# Patient Record
Sex: MALE | Race: BLACK OR AFRICAN AMERICAN | NOT HISPANIC OR LATINO | URBAN - METROPOLITAN AREA
[De-identification: names, ages, dates, MRNs, and addresses within clinical notes are randomized per-mention and may not be internally consistent; named-entity substitution may affect disease eponyms.]

---

## 2024-04-17 ENCOUNTER — PATIENT OUTREACH (OUTPATIENT)
Dept: OTOLARYNGOLOGY | Facility: CLINIC | Age: 39
End: 2024-04-17
Payer: COMMERCIAL

## 2024-04-17 ENCOUNTER — TELEPHONE (OUTPATIENT)
Dept: OTOLARYNGOLOGY | Facility: CLINIC | Age: 39
End: 2024-04-17
Payer: COMMERCIAL

## 2024-04-17 NOTE — PROGRESS NOTES
Called patient to let let him know since we have not seen him odalis gómez able to formally recommend voice rest. Writer encouraged patient to do what he thought would be best for his voice. Patient was agreeable and verbalized understanding of the situation. Alicja Hatch RN on 4/17/2024 at 2:37 PM

## 2024-04-17 NOTE — TELEPHONE ENCOUNTER
FUTURE VISIT INFORMATION      FUTURE VISIT INFORMATION:  Date: 4/18/24  Time: 1:00pm  [place] [facility] [place]ERRAL INFORMATION:  Reason for visit/diagnosis  newby- lost voice    RECORDS REQUESTED FROM:       No recs to collect

## 2024-04-17 NOTE — TELEPHONE ENCOUNTER
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 Pt lost voice on 4/10 - slowly came back with speaking 1st on 4/12 and was able to sing for performances on 4/14 and 4/16 - Pt is in [employer] with upcoming performances.  Please have [facility] SLP per protocols, call back pt to discuss.  [facility] [place]     Action Taken: Other: ENT    Travel Screening: Not Applicable

## 2024-04-18 ENCOUNTER — PRE VISIT (OUTPATIENT)
Dept: OTOLARYNGOLOGY | Facility: CLINIC | Age: 39
End: 2024-04-18

## 2024-04-18 ENCOUNTER — OFFICE VISIT (OUTPATIENT)
Dept: OTOLARYNGOLOGY | Facility: CLINIC | Age: 39
End: 2024-04-18
Payer: COMMERCIAL

## 2024-04-18 DIAGNOSIS — R49.0 DYSPHONIA: Primary | ICD-10-CM

## 2024-04-18 PROCEDURE — 31579 LARYNGOSCOPY TELESCOPIC: CPT | Performed by: SPEECH-LANGUAGE PATHOLOGIST

## 2024-04-18 PROCEDURE — 92507 TX SP LANG VOICE COMM INDIV: CPT | Mod: GN | Performed by: SPEECH-LANGUAGE PATHOLOGIST

## 2024-04-18 PROCEDURE — 92524 BEHAVRAL QUALIT ANALYS VOICE: CPT | Mod: GN | Performed by: SPEECH-LANGUAGE PATHOLOGIST

## 2024-04-18 NOTE — LETTER
4/18/2024       RE: Rafa Caldwell  [address] [place] [address]     Dear Colleague,    Thank you for referring your patient, Rafa Caldwell, to the [facility] [place] at [facility]. Please see a copy of my visit note below.          [facility]  Clinical Voice and Upper Airway Evaluation Report      Patient's Name: Rafa Caldwell  Date of Evaluation: 4/18/2024  Providing SLP: Senait Wagner, AMANDA< MA, CCC-SLP (was originally scheduled to see Johnna Magdaleno MS CCC-SLP, who had a medical emergency)  Referring Provider and Facility: self-referred  Insurance Coverage: [employer]  Chief Complaint: Dysphonia  Evaluation Location: [facility]    Patient History: Rafa is a 38-year-old male who presents today for evaluation of dysphonia. 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  He has worked with Dr. Henderson in [place], and Dimitry Giles, vocal  & [employer].     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  Management has been supportive.  Improves with: unknown at this time, but he has been resting his voice more  Worsens with: use  Previous voice therapy or other interventions: continued his regular warm up exercises.  No cool down exercise  Voice use considerations:   Currently performing on national [place] tour of [employer]  Will be in [place] until 4/28/24  The tour will then continue to [facility]    Dyspnea:   No issues    Dysphagia:   No issues    Cough / Throat Clearing:   No issues    Throat Pain:  No issues    Medical / Surgical History:   No remarkable history    Cognitive Status / Ability to Comprehend Directions:  "Excellent    Barriers to Progress: none    Daily Water Intake: good    Caffeine Intake: none    Alcohol Intake: none    Past / Current Tobacco Use / Exposure: none.  Stage fog only - minimal     Current Diet: very good. However, he does recall having a piece of pizza the day prior to the start of his symptoms.    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        Quality of Life Questionnaires:    PATIENT REPORTED MEASURES:       No data to display              Patient Supplied Answers To Last 2 VHI Questionnaires       No data to display                  Patient Supplied Answers To Last 2 CSI Questionnaires       No data to display                  Patient Supplied Answers To Last 2 EAT Questionnaires       No data to display                  Patient Supplied Answers to Dyspnea Index Questionnaire:       No data to display              Patient Supplied Answers To VHI Questionnaire      4/19/2024     1:37 PM   Voice Handicap Index (VHI-10)   My voice makes it difficult for people to hear me 0   People have difficulty understanding me in a noisy room 0   My voice difficulties restrict my personal and social life.  2   I feel left out of conversations because of my voice 2   My voice problem causes me to lose income 2   I feel as though I have to strain to produce voice 1   The clarity of my voice is unpredictable 2   My voice problem upsets me 1   My voice makes me feel handicapped 2   People ask, \"What's wrong with your voice?\" 1   VHI-10 13       Perceptual Analysis (58612): Evaluation of Voice / Speech / Non-Communicative Laryngeal Behaviors    Voice: mild to moderate roughness and breathiness. Intermittent subtle pitch instablility    Laryngeal palpation:   Thyrohyoid space: tender to touch  Suprahyoid region: WNL  Laryngeal lateralization: flexible    Additional observations:   Cough/ Throat Clear: not observed today    Breathing patterns: appropriate at rest   Overt tension: " minimal, but does endorse tension in the upper body  Habitual pitch: WNL compared to age- and gender-matched peers   Resonance: laryngeal  Loudness: appropriate mildly reduced    Acoustic and Aerodynamic Analysis (95310):  will be processed at a future appointment      Laryngoscopy with stroboscopy: Flexible endoscopy with chip-tip technology with stroboscopy, right nostril; topical anesthesia with 2% lidocaine and 0.025% oxymetazoline was sprayed into the nasal cavity and allowed 3 to 5 minutes for effect.    Provider performing exam: Senait Wagner MM. M.A. CCC-SLP    Informed consent: Informed verbal consent was obtained, which includes potential side-effects, risks, and benefits of the procedure.     Anesthetic: Topical anesthesia with 3% lidocaine and 0.25% phenylephrine was applied the nostrils bilaterally.     Scope type: A distal chip flexible laryngoscope was passed through the nare with xenon light source(s).    The laryngeal and pharyngeal structures were evaluated for gross appearance, mobility, function, and focal lesions / abnormalities of the associated mucosa.  *It should be noted that he has a significant gag reflex. Multiple trials were needed today to complete the exam with the necessary images.    Velar Function: complete closure without bubbling of secretions and no weakness observed   General Appearance: WNL   Secretions: WNL   Subglottis Appearance: visible portion is patent   R Arytenoid Abduction / Adduction: symmetrical and timely ab/adduction with appropriate range of motion  L Arytenoid Abduction / Adduction: symmetrical and timely ab/adduction with appropriate range of motion  Mediolateral Compression: moderately   Anteroposterior Compression: moderate  Significant pharyngeal squeeze was also noted.  Vocal Fold Elongation: appropriate   Left (L) Vocal Fold Edge and Mucosa: off white-pink and moderate edema comprising the entire length of the vocal fold. No visible signs of hemorrhage.  "  Right (R) Vocal Fold Edge and Mucosa: white with smooth and straight appearance   Narrow Band Imaging: demonstrates similar findings as halogen light. No remarkable thickening of the mucosa  Glottic Closure: complete   Phase Closure: predominantly open phase     The addition of stroboscopy allowed evaluation of the mucosal wave:  Amplitude: right: WNL; left: mildly increased.   Symmetry: intermittent symmetry and intermittent chasing asymmetry.  Closure pattern: complete.   Closure plane: at glottic level.   Phase distribution: normal.    Therapeutic Probes: flow phonation and resonant phonemes were helpful to reduce supraglottic tension    Therapeutic Techniques Attempted (73280 for Individual Speech Therapy):    Semi-Occluded Vocal Tract (SOVT) exercises instructed to reduce laryngeal tension, promote vocal fold pliability, and coordinate respiration and phonation  Straw phonation with water resistance was found to be most facilitating   Sustained phonation, and voice vs. voiceless productions used to promote easy voicing and raise awareness of laryngeal tension  Ascending and descending glides utilized to promote vocal fold pliability  \"Messa di voce\", gradual crescendo and decrescendo to vary medial compression was also utilized to promote vocal fold pliability.  Instructed on the benefits of using these exercises for improved coordination of breath flow with phonation and tissue mobilization.  Instructed on the importance of using these exercises as a warm-up / cool down,  and to re-calibrate the voice throughout the day.    Counseling was provided to support his voice symptoms:   - I also provided a letter for his management.      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 Perceptually, he demonstrated mild to moderate roughness and breathiness, and intermittent subtle pitch instability.  " Laryngoscopy today demonstrated off white-pink and moderate edema comprising the entire length of the vocal fold. No visible signs of hemorrhage. Therefore, continued care and therapy have been recommended.  He is in agreement with this plan of care.  Basic instructions were provided.       Dysphonia R49.0  Laryngeal Hyperfunction J38.7  Vocal Fold Edema      Goals:    Voice  Utilize abdominal breathing techniques in targeted activities (e.g. physical exercise, communication, high-level phonation/pitch range navigation exercises, etc.)  Demonstrate appropriate vocal hygiene including, but not limited to, adequate hydration and integrating behavioral and dietary changes for GERD into their daily life.?   Recoordinate respiratory and laryngeal musculature to resume activities of daily living.?   Coordinate phonatory and respiratory subsystems, demonstrating adequate independence for activities of daily communication   Decrease extrinsic laryngeal muscle activity during communication events     Laryngoscopy with stroboscopy 05782  Individual speech therapy session 69297  Perceptual voice assessment 27321  Assessment and treatment time: 75 minutes      Senait Wagner MM, MA CCC-SLP  Speech-Language Pathologist & Voice SLP Supervisor  [facility]  Department of Otolaryngology - Head and Neck Surgery  [facility]  [phone]    *This note may have been completed using [id number] dictation software, so errors may exist. Please contact me for clarification if needed*      Again, thank you for allowing me to participate in the care of your patient.      Sincerely,    Senait Wagner, SLP

## 2024-04-18 NOTE — PROGRESS NOTES
[facility]  Clinical Voice and Upper Airway Evaluation Report      Patient's Name: Rafa Caldwell  Date of Evaluation: 4/18/2024  Providing SLP: Senait Wagner, MM< MA, CCC-SLP (was originally scheduled to see Johnna Magdaleno MS CCC-SLP, who had a medical emergency)  Referring Provider and Facility: self-referred  Insurance Coverage: [employer]  Chief Complaint: Dysphonia  Evaluation Location: [facility]    Patient History: Rafa is a 38-year-old male who presents today for evaluation of dysphonia. 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  He has worked with Dr. Henderson in [place], and Dimitry Giles, vocal  & [employer].     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  Management has been supportive.  Improves with: unknown at this time, but he has been resting his voice more  Worsens with: use  Previous voice therapy or other interventions: continued his regular warm up exercises.  No cool down exercise  Voice use considerations:   Currently performing on national [place] tour of [employer]  Will be in [place] until 4/28/24  The tour will then continue to [facility]    Dyspnea:   No issues    Dysphagia:   No issues    Cough / Throat Clearing:   No issues    Throat Pain:  No issues    Medical / Surgical History:   No remarkable history    Cognitive Status / Ability to Comprehend Directions: Excellent    Barriers to Progress: none    Daily Water Intake: good    Caffeine Intake: none    Alcohol Intake: none    Past / Current Tobacco Use / Exposure: none.  Stage fog only - minimal     Current Diet: very good. However, he does recall having a piece of pizza the day prior to the start of his  "symptoms.    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        Quality of Life Questionnaires:    PATIENT REPORTED MEASURES:       No data to display              Patient Supplied Answers To Last 2 VHI Questionnaires       No data to display                  Patient Supplied Answers To Last 2 CSI Questionnaires       No data to display                  Patient Supplied Answers To Last 2 EAT Questionnaires       No data to display                  Patient Supplied Answers to Dyspnea Index Questionnaire:       No data to display              Patient Supplied Answers To VHI Questionnaire      4/19/2024     1:37 PM   Voice Handicap Index (VHI-10)   My voice makes it difficult for people to hear me 0   People have difficulty understanding me in a noisy room 0   My voice difficulties restrict my personal and social life.  2   I feel left out of conversations because of my voice 2   My voice problem causes me to lose income 2   I feel as though I have to strain to produce voice 1   The clarity of my voice is unpredictable 2   My voice problem upsets me 1   My voice makes me feel handicapped 2   People ask, \"What's wrong with your voice?\" 1   VHI-10 13       Perceptual Analysis (62507): Evaluation of Voice / Speech / Non-Communicative Laryngeal Behaviors    Voice: mild to moderate roughness and breathiness. Intermittent subtle pitch instablility    Laryngeal palpation:   Thyrohyoid space: tender to touch  Suprahyoid region: WNL  Laryngeal lateralization: flexible    Additional observations:   Cough/ Throat Clear: not observed today    Breathing patterns: appropriate at rest   Overt tension: minimal, but does endorse tension in the upper body  Habitual pitch: WNL compared to age- and gender-matched peers   Resonance: laryngeal  Loudness: appropriate mildly reduced    Acoustic and Aerodynamic Analysis (78726):  will be processed at a future appointment      Laryngoscopy with stroboscopy: " Flexible endoscopy with chip-tip technology with stroboscopy, right nostril; topical anesthesia with 2% lidocaine and 0.025% oxymetazoline was sprayed into the nasal cavity and allowed 3 to 5 minutes for effect.    Provider performing exam: Senait Wagner MM. M.A. CCC-SLP    Informed consent: Informed verbal consent was obtained, which includes potential side-effects, risks, and benefits of the procedure.     Anesthetic: Topical anesthesia with 3% lidocaine and 0.25% phenylephrine was applied the nostrils bilaterally.     Scope type: A distal chip flexible laryngoscope was passed through the nare with xenon light source(s).    The laryngeal and pharyngeal structures were evaluated for gross appearance, mobility, function, and focal lesions / abnormalities of the associated mucosa.  *It should be noted that he has a significant gag reflex. Multiple trials were needed today to complete the exam with the necessary images.    Velar Function: complete closure without bubbling of secretions and no weakness observed   General Appearance: WNL   Secretions: WNL   Subglottis Appearance: visible portion is patent   R Arytenoid Abduction / Adduction: symmetrical and timely ab/adduction with appropriate range of motion  L Arytenoid Abduction / Adduction: symmetrical and timely ab/adduction with appropriate range of motion  Mediolateral Compression: moderately   Anteroposterior Compression: moderate  Significant pharyngeal squeeze was also noted.  Vocal Fold Elongation: appropriate   Left (L) Vocal Fold Edge and Mucosa: off white-pink and moderate edema comprising the entire length of the vocal fold. No visible signs of hemorrhage.   Right (R) Vocal Fold Edge and Mucosa: white with smooth and straight appearance   Narrow Band Imaging: demonstrates similar findings as halogen light. No remarkable thickening of the mucosa  Glottic Closure: complete   Phase Closure: predominantly open phase     The addition of stroboscopy allowed  "evaluation of the mucosal wave:  Amplitude: right: WNL; left: mildly increased.   Symmetry: intermittent symmetry and intermittent chasing asymmetry.  Closure pattern: complete.   Closure plane: at glottic level.   Phase distribution: normal.    Therapeutic Probes: flow phonation and resonant phonemes were helpful to reduce supraglottic tension      Moderate to severe 4-way constriction      Adduction is complete, left vocal fold demonstrates edema changes)      Partially Abducted view.    Therapeutic Techniques Attempted (37692 for Individual Speech Therapy):    Semi-Occluded Vocal Tract (SOVT) exercises instructed to reduce laryngeal tension, promote vocal fold pliability, and coordinate respiration and phonation  Straw phonation with water resistance was found to be most facilitating   Sustained phonation, and voice vs. voiceless productions used to promote easy voicing and raise awareness of laryngeal tension  Ascending and descending glides utilized to promote vocal fold pliability  \"Messa di voce\", gradual crescendo and decrescendo to vary medial compression was also utilized to promote vocal fold pliability.  Instructed on the benefits of using these exercises for improved coordination of breath flow with phonation and tissue mobilization.  Instructed on the importance of using these exercises as a warm-up / cool down,  and to re-calibrate the voice throughout the day.    Counseling was provided to support his voice symptoms:   - I also provided a letter for his management.      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 Perceptually, he demonstrated mild to moderate roughness and breathiness, and intermittent subtle pitch instability.  Laryngoscopy today demonstrated off white-pink and moderate edema comprising the entire length of the vocal fold. No visible signs of hemorrhage. Therefore, continued " care and therapy have been recommended.  He is in agreement with this plan of care.  Basic instructions were provided.       Dysphonia R49.0  Laryngeal Hyperfunction J38.7  Vocal Fold Edema      Goals:    Voice  Utilize abdominal breathing techniques in targeted activities (e.g. physical exercise, communication, high-level phonation/pitch range navigation exercises, etc.)  Demonstrate appropriate vocal hygiene including, but not limited to, adequate hydration and integrating behavioral and dietary changes for GERD into their daily life.?   Recoordinate respiratory and laryngeal musculature to resume activities of daily living.?   Coordinate phonatory and respiratory subsystems, demonstrating adequate independence for activities of daily communication   Decrease extrinsic laryngeal muscle activity during communication events     Laryngoscopy with stroboscopy 26838  Individual speech therapy session [id number]  Perceptual voice assessment 06101  Assessment and treatment time: 75 minutes      Senait Wagner MM, MA CCC-SLP  Speech-Language Pathologist & Voice SLP Supervisor  [facility]  Department of Otolaryngology - Head and Neck Surgery  [facility]  [phone]    *This note may have been completed using [id number] dictation software, so errors may exist. Please contact me for clarification if needed*

## 2024-04-18 NOTE — PATIENT INSTRUCTIONS
[facility]  Patrice Rodriguez Jr., M.D., F.A.C.S.  Nhi Lawrence M.D., M.P.H.  Bertha Adair M.D.  Senait Wagner M.M. (voice), M.A., CCC-SLP  Mervat Win, Ph.D., CCC-SLP  Raplh Pichardo, Ph.D., CCC-SLP  Johnna Magdaleno M.S., CCC-SLP  Herve Frausto M.M., M.A., CCC-SLP  TARA Sherman (voice), M.S., CCC-SLP    Date of Visit: 4/18/2024    To: Whom it may concern    RE: Mr. Rafa Caldwell    Dear Whom it may concern,    This letter is in support of accommodation required by my patient,  Rafa Caldwell. He is under the care of Ms. Senait Wagner at [facility] in the Department of Otolaryngology.  He was last seen by me on 4/18/24, and was diagnosed with moderate unilateral vocal fold edema and erythema and secondary dysphonia.  We recommend that he not perform on 4/18/24 or 4/19/24.  He can return to work for the [employer] performance on Saturday 4/20/24, as long as he takes it easy. The current management and treatment plan includes medical intervention and speech therapy.  I will be consulting more with one of our laryngologists, Dr. Nhi Lawrence, regarding his voice issues/ concerns.    To appropriately treat this condition it is medically necessary that Mr. Caldwell:  Dr. Lawrence may recommend medical intervention, which I will keep him informed about.  Receive rehabilitative speech therapy services (CPT Code 96709).  This course of treatment will focus on strategies to:  improve vocal efficiency   decrease laryngeal irritability  address any risk of needing future surgical intervention.    Specific recommendations that are medically necessary for Mr. Caldwell to complete daily include:  Reduce daily talking to 10% of a typical day.  Limit Mr. Caldwell voice use at work and at home, including loud talking, shouting, projection, etc., other that the singing for the [employer] performance.  No phone use for more than a 2 minutes (caller speaking more than Mr. Caldwell)  Avoid  lifting weight greater than 25 lbs.    Semi-occluded vocal tract exercises are needed every hour(s) in order to complete the therapeutic voice activities learned in speech therapy.  Wear a mask when exposed to harsh, irritating chemicals (eg: cleaning products, markers, stage smoke)    Mr. Caldwell will continue therapy and to follow up with us post-surgically to monitor progress and healing, as well as attending additional speech therapy appointments.      The next scheduled appointment will be at 2 pm on 4/18/24.  We will provide an update with modifications to the current voice recommendations and restrictions at that time.    Thank you for your consideration in this matter.  If you have any questions, please do not hesitate to contact us at [phone].    Sincerely,     Senait Wagner M.M. (voice), M.A., CCC-SLP  Speech-Language Pathologist & Voice SLP Supervisor  [facility] [place] [facility] [employer] - Doctor of Speech-Language Pathology Candidate '24  [employer] Trained Vocologist  [facility]  she/her  [email or web address]

## 2024-04-19 ENCOUNTER — THERAPY VISIT (OUTPATIENT)
Dept: OTOLARYNGOLOGY | Facility: CLINIC | Age: 39
End: 2024-04-19

## 2024-04-19 ENCOUNTER — OFFICE VISIT (OUTPATIENT)
Dept: OTOLARYNGOLOGY | Facility: CLINIC | Age: 39
End: 2024-04-19
Payer: COMMERCIAL

## 2024-04-19 DIAGNOSIS — R49.0 DYSPHONIA: Primary | ICD-10-CM

## 2024-04-19 PROCEDURE — 92520 LARYNGEAL FUNCTION STUDIES: CPT | Mod: 52 | Performed by: SPEECH-LANGUAGE PATHOLOGIST

## 2024-04-19 PROCEDURE — 92507 TX SP LANG VOICE COMM INDIV: CPT | Mod: GN | Performed by: SPEECH-LANGUAGE PATHOLOGIST

## 2024-04-19 NOTE — PATIENT INSTRUCTIONS
"After Visit Summary    Patient: Rafa Caldwell  Date of Visit: 4/19/2024    These notes are also available in your MyChart. Please take a few moments to find them under \"Past Appointments\" in the [facility] system, as [facility] will start to phase out e-mail communications.    \"Handouts\" that go along with today's order of activities include (below):   Frequency of practice: 2-3x/day unless marked otherwise    Order of today's appointment:    GERD (Gastro Esophageal Reflux Disorder)  Other names  reflux   Laryngopharyngeal Reflux Disorder (LPRD)   Symptoms  dry, choking sensation, especially during the night   voice quality that is worst in the morning   raw, burning sensation in the throat   pain in throat, neck, or running from back of chin along neck   frequent coughing or desire to clear throat   rough, gritty voice quality   decline in voice quality or comfort with continued voice use   a sour taste in your mouth upon waking up       Interestingly, the majority of the patients in the [facility] who have laryngeal symptoms of GERD do not have any stomach discomfort or sensation of heartburn.   Cause  Acid from the stomach refluxes back up through the esophagus and spills over onto the larynx. This irritates the vocal folds (also called vocal cords; see the explanation of this terminology) and creates inflammation, which causes the vocal folds to vibrate unevenly. Coughing and throat clearing from the irritation can make the inflammation worse. The resulting voice disorder is often related to the poor vibratory quality of the inflamed vocal folds and the muscle tension created by effortful attempts at compensation.  Treatment  Anti-reflux medication and dietary precautions are the first line of defense. Functional voice therapy is useful to teach techniques for reducing effortful compensation and instruct the individual in improved vocal hygiene.  At the [facility], we do not hand out a list of foods and " beverages that must be avoided. Rather, we educate about types of foods and beverages known to cause reflux and encourage the individual to systematically investigate which foods stimulate their own reflux. Also, the individual is encouraged to manage reflux under the care of a Gastroenterologist (gastrointestinal specialist).  Interested readers are encouraged to look at the website of the [facility] for a more in depth discussion of GERD and the voice (see our Links page).  Lifestyle changes that may help reduce symptoms of GERD/LPRD  eat smaller meals more frequently throughout the day, rather than three large meals   elevate the head of your bed 2-3 inches (don't just use extra pillows for your head)   avoid clothes that fit tightly around the waist   avoid lying down within 2-3 hours of eating (don't eat dinner late at night)   Types of foods known to trigger increased stomach acid  spicy food (such as chili or jalapeño peppers, Salvadorean or Szechuan spices)   acidic foods such as tomato products or citrus products   greasy foods   caffeine   alcohol   carbonation   roughage, such as popcorn and peanuts, or raw vegetables   dairy products   strong mint such as peppermint candies   chocolate     Reading this list might make you think you can only eat bread and oatmeal for the rest of your life. Fortunately, most individuals are not triggered by everything on this list. For example, for every person who is lactose intolerant and has problems with dairy products, there may be someone else whose digestive system is calmed by milk. Also, in our experience, few persons are triggered by peppermints or chocolate. Therefore, we recommend that you experiment with your own dietary habits, changing one food class at a time. Also, if you have recently started taking anti-reflux medications, you may want to wait for several months, to see how the medication works without any change in  your dietary habits.      Phrases for Blending   fall over  go into   put upon   leave on  the only  lose it   see it   the other  win it   do it   not even  that's enough   put on   not any  leave open   down under  cold as   one of   not old   the ice   she's ill   look at   he's ill   then add   The(e) (y)end  yes and  so it   high up  one at   Marya is    Fall_over_the_chair                      Go_(w)into the store  Leave_on_the_lights                     The(e)_(y)only one  See_it_over_there                         The(e)_(y)other day  Do_it_now    Not_even her mom  Put(d)_on_your_shoes         Not_any more  Down_under_the_stairs  Cold_as ice  Not_old_enough   the ice is cold  Look_at_the_sky   he's (z)ill with the flu  The_end_of_the_story  yes_and no  High_up_on_the_shelf  one at a time       Patrice Rodriguez Jr., GEORGE Lawrence M.D.     Bertha Adair M.D.       Mervat Win, Ph.D.  Senait Wagner M.M., M.A.                 Herber Frausto M.M., M.A.           Jennifer Ruiz, B.M. M.S.         [phone]        Senait Wagner    Tips to Improve Topical Hydration and Reduce Laryngeal Irritation    Nasal Irrigation:  morning and night    times a day  Saline nasal spray ___1-2________ per day      Saline gel  Gargle:  Using Saline    morning and night     times a day   With plain water (room temp or warm)  Throughout the day (2-3x/day, luzmaria. after meals, or when having increased throat irritation symptoms).  To help reduce throat irritation, the feeling of mucus in the throat, improve topical hydration and to reduce throat clearing, coughing.      Saline Recipe: For garglin-8 oz glass   warm water   tap or distilled (your preference)  feel for right temperature (not too hot or cold)  warm enough to dissolve the salt  1/4 tsp. kosher salt, or sea salt   Additives in table salt can cause irritation/ discomfort.  1/4 tsp. baking soda  (OR alternate salt and soda with one saline  packet) - Neilmed is a common brand found in the sinus aisle.        Nasal Dryness:  For light congestion, post-nasal drainage, dryness in the nasal passages, try a saline nasal spray (very cheap - any brand).  Saline nasal gel, like Ayr, or NasoGEL are also very helpful. Chelsea can be found at [employer], but there are many similar products to be found in the allergy/nasal aisle of any drug or retail store.  Saline gels also help with colds/flu, allergies, winter dryness, low humidity, CPAP/BiPaP, chronic Sinusitis, flying, nosebleeds (a.k.a.: epistaxis), and oxygen therapy.    Lozenges:                     Non-Menthol drops are recommended since menthol is        drying, which includes (regular or sugar-free):          Ludens, Kate Bremaria eugenia, Smith Brothers, Life Savers, Big Beaver                                           Ranchers.  Most Ricola drops have menthol (check the                                          package before you buy). Everything in moderation -        maintain good oral hygiene and avoid cavities.  Running Water (xlear.com) and other xylitol products are also recommended.    Humidifiers:   I recommend Meek or Luque, which tend to be quieter than other brands.  Use especially while sleeping, for improved nasal/ oral topical hydration, and improved sleep.  Please consider purchasing one at the end of the season (March/April), if you cannot afford one now.      Consider the humidifier for use especially at night time when there is minimal systemic hydration.  Fill with enough water for 1-2 nights; clean base and water container at least once per week with a water/vinegar mixture.  Always follow instructions as directed by .        Steam:   Facial steamers/ warm, moist washcloth _2-3____x/day for ___10-12_____minutes.      Senait Wagner M.M. (voice), M.A., CCC/SLP  Speech-Language Pathologist  [facility] Certified Vocologist  [facility]  [email or web address]  she/her

## 2024-04-19 NOTE — PROGRESS NOTES
"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 Perceptually, he demonstrated mild to moderate roughness and breathiness, and intermittent subtle pitch instability.  Laryngoscopy today demonstrated off white-pink and moderate edema comprising the entire length of the vocal fold. No visible signs of hemorrhage. Therefore, continued care and therapy have been recommended.  He is in agreement with this plan of care.  Basic instructions were provided.   Dysphonia R49.0  Laryngeal Hyperfunction J38.7  Vocal Fold Edema\"    SUBJECTIVE:  Since his last session, Mr. Caldwell reports the following:   Overall he reports that symptoms are stable  Last wednesday  Change of weather  Silent reflux  2 allergy pills.       OBJECTIVE:  Mr. Caldwell presents today with the following:  Voice: mild to moderate roughness and breathiness. Intermittent subtle pitch instablility       PATIENT REPORTED MEASURES:  Patient Supplied Answers To SLP QOL Questionnaire       No data to display              /a/ mean f0 = 142.818 Hz (SD = 2.327)  /i/ mean f0 = 146.457 Hz (SD = 1.997)  Glide:     Lowest f0 = 75.419 Hz      Higest f0 = 622.904 Hz      Range = 547.485 Hz   Connected Speech     Mean f0 = 131.018 Hz (SD 31.837)     Median f0 = 124.026 Hz      Lowest f0 = 99.996 Hz      Highest f0 = 498.972 Hz      Speaking Range = 398.976 Hz         THERAPEUTIC ACTIVITIES  Exercises to promote optimal respiratory mechanics  Practiced in a supine position on the massage table, with tactile cue of a hand on the low rib-cage to facilitate awareness of low respiratory engagement.  Progressed to seated and standing today.  With clinician support, patient was " "able to demonstrate improved abdominal relaxation and engagement on inhalation  Optimal exhalation using inward engagement of the abdominal wall with no corresponding collapse of the upper chest cavity was trained using the pulsed \"sh\" task  [place] a respiratory pacing exercise; this was helpful  Learning techniques to optimize coordination between breath flow and phonation was significantly helpflu.  acceptable improvement in airflow and respiratory mechanics    Exercises and techniques for optimal vocal hygiene including:  Systemic hydration, including strategies for increasing daily water intake  Topical hydration - Gargling (saline and plain water), saline nasal irrigation, humidification, steam, guaifenesin to reduce the thickened secretions / laryngeal irritation.  Management of laryngopharyngeal reflux disease (LPRD)  Dietary alterations which may reduce liklihood of reflux events  Avoiding eating or drinking within 2-3 hours of bedtime  Raising the headboard of the bed by 3-4 inches  Avoiding eating and drinking immediately prior to rigorous activity  Proper timing and use of acid reflux medication discussed in general context with recommendation of follow-up with pharmacist for detailed instructions  Recommended reflux gourmet    Exercises in techniques for improved airflow during phonation  Speech material with /ju/ glides and aspirate onsets was facilitating at the word level.  Progressed to easy onset/ flow, and blending phrases  Instructed with a descending 5th, as well as an arpeggio pattern; this was helpful.  [place] techniques to reduce glottal osuna and improve breath flow; negative practice improved awareness today.  Also applied techniques to optimize forward resonance with the exericises.  He demonstrated very good learning.  .  Counseling and Education:  Asked many questions about the nature of his symptoms, and I answered all of these thoroughly.  A revised regimen for home practice was " instructed.  I provided an AVS of today's therapeutic activities to facilitate practice.    ASSESSMENT/PLAN  PROGRESS TOWARD LONG TERM GOALS:   Adequate progress; too early for objective measures    IMPRESSIONS:  Dysphonia R49.0 secondary to edema of the left vocal fold. Mr. Caldwell demonstrated good learning of therapeutic activities to optimize his vocal hygiene and voice production for speaking and singing activities.  He will continue to practice and apply the techniques learned to his daily and performing activities.     PLAN: I will see Mr. Caldwell on Tuesday for a follow-up laryngeal exam.   For practice goals see AVS.     Next Clinic Appt: 4/23    TOTAL SERVICE TIME: 120 minutes  TREATMENT (80410): 120 minutes  NO CHARGE FACILITY FEE (57356)    Senait Wagner M.M. (voice), M.A., CCC/SLP  Speech-Language Pathologist  Certificate of Vocology  [facility]  [phone]  [email or web address]  Pronouns: she/her

## 2024-04-19 NOTE — LETTER
"4/19/2024       RE: Rafa Caldwell  [address] [place] [address]     Dear Colleague,    Thank you for referring your patient, Rafa Caldwell, to the [facility] [place] [facility]. Please see a copy of my visit note below.    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 Perceptually, he demonstrated mild to moderate roughness and breathiness, and intermittent subtle pitch instability.  Laryngoscopy today demonstrated off white-pink and moderate edema comprising the entire length of the vocal fold. No visible signs of hemorrhage. Therefore, continued care and therapy have been recommended.  He is in agreement with this plan of care.  Basic instructions were provided.   Dysphonia R49.0  Laryngeal Hyperfunction J38.7  Vocal Fold Edema\"    SUBJECTIVE:  Since his last session, Mr. Caldwell reports the following:   Overall he reports that symptoms are stable  Last wednesday  Change of weather  Silent reflux  2 allergy pills.       OBJECTIVE:  Mr. Caldwell presents today with the following:  Voice: mild to moderate roughness and breathiness. Intermittent subtle pitch instablility       PATIENT REPORTED MEASURES:  Patient Supplied Answers To SLP QOL Questionnaire       No data to display              /a/ mean f0 = 142.818 Hz (SD = 2.327)  /i/ mean f0 = 146.457 Hz (SD = 1.997)  Glide:     Lowest f0 = 75.419 Hz      Higest f0 = 622.904 Hz      Range = 547.485 Hz   Connected Speech     Mean f0 = 131.018 Hz (SD 31.837)     Median f0 = 124.026 Hz      Lowest f0 = 99.996 Hz      Highest f0 = 498.972 Hz      Speaking Range = 398.976 Hz " "        THERAPEUTIC ACTIVITIES  Exercises to promote optimal respiratory mechanics  Practiced in a supine position on the massage table, with tactile cue of a hand on the low rib-cage to facilitate awareness of low respiratory engagement.  Progressed to seated and standing today.  With clinician support, patient was able to demonstrate improved abdominal relaxation and engagement on inhalation  Optimal exhalation using inward engagement of the abdominal wall with no corresponding collapse of the upper chest cavity was trained using the pulsed \"sh\" task  [place] a respiratory pacing exercise; this was helpful  Learning techniques to optimize coordination between breath flow and phonation was significantly helpflu.  acceptable improvement in airflow and respiratory mechanics    Exercises and techniques for optimal vocal hygiene including:  Systemic hydration, including strategies for increasing daily water intake  Topical hydration - Gargling (saline and plain water), saline nasal irrigation, humidification, steam, guaifenesin to reduce the thickened secretions / laryngeal irritation.  Management of laryngopharyngeal reflux disease (LPRD)  Dietary alterations which may reduce liklihood of reflux events  Avoiding eating or drinking within 2-3 hours of bedtime  Raising the headboard of the bed by 3-4 inches  Avoiding eating and drinking immediately prior to rigorous activity  Proper timing and use of acid reflux medication discussed in general context with recommendation of follow-up with pharmacist for detailed instructions  Recommended reflux gourmet    Exercises in techniques for improved airflow during phonation  Speech material with /ju/ glides and aspirate onsets was facilitating at the word level.  Progressed to easy onset/ flow, and blending phrases  Instructed with a descending 5th, as well as an arpeggio pattern; this was helpful.  [place] techniques to reduce glottal osuna and improve breath flow; negative " practice improved awareness today.  Also applied techniques to optimize forward resonance with the exericises.  He demonstrated very good learning.  .  Counseling and Education:  Asked many questions about the nature of his symptoms, and I answered all of these thoroughly.  A revised regimen for home practice was instructed.  I provided an AVS of today's therapeutic activities to facilitate practice.    ASSESSMENT/PLAN  PROGRESS TOWARD LONG TERM GOALS:   Adequate progress; too early for objective measures    IMPRESSIONS:  Dysphonia R49.0 secondary to edema of the left vocal fold. Mr. Caldwell demonstrated good learning of therapeutic activities to optimize his vocal hygiene and voice production for speaking and singing activities.  He will continue to practice and apply the techniques learned to his daily and performing activities.     PLAN: I will see Mr. Caldwell on Tuesday for a follow-up laryngeal exam.   For practice goals see AVS.     Next Clinic Appt: 4/23    TOTAL SERVICE TIME: 120 minutes  TREATMENT (81911): 120 minutes  NO CHARGE FACILITY FEE (18220)    Senait Wagner M.M. (voice), M.A., CCC/SLP  Speech-Language Pathologist  Certificate of Vocology  [facility]  [phone]  [email or web address]  Pronouns: she/her      Again, thank you for allowing me to participate in the care of your patient.      Sincerely,    Senait Wagner, SLP

## 2024-04-23 ENCOUNTER — OFFICE VISIT (OUTPATIENT)
Dept: OTOLARYNGOLOGY | Facility: CLINIC | Age: 39
End: 2024-04-23
Payer: COMMERCIAL

## 2024-04-23 DIAGNOSIS — R49.0 DYSPHONIA: Primary | ICD-10-CM

## 2024-04-23 PROCEDURE — 92507 TX SP LANG VOICE COMM INDIV: CPT | Mod: GN | Performed by: SPEECH-LANGUAGE PATHOLOGIST

## 2024-04-23 PROCEDURE — 31579 LARYNGOSCOPY TELESCOPIC: CPT | Performed by: SPEECH-LANGUAGE PATHOLOGIST

## 2024-04-23 NOTE — PATIENT INSTRUCTIONS
"After Visit Summary    Patient: Rafa Caldwell  Date of Visit: 4/23/2024    These notes are also available in your MyChart. Please take a few moments to find them under \"Past Appointments\" in the [facility] system, as [facility] will start to phase out e-mail communications.    \"Handouts\" that go along with today's order of activities include (below):   Frequency of practice: 2-3x/day unless marked otherwise    Order of today's appointment:  After Visit Summary    Patient: Rafa Caldwell  Date of Visit: 4/23/2024      Gag Desensitization/ Relieving strategies:  Personal Triggers:   Getting a finger or instrument within a few millimeters of the soft palate or uvula  Touching the outside of the throat  Placing something close to the tonsils  Using a strongly scented or flavored tooth paste  Letting too much water/saliva accumulate in the back of the mouth    Tongue Touching/Brushing 2-3x/day (slowly add frequency of practice):  Gradually reduce soft palate sensitivity to touch by:    Brush your tongue where your gag begins (Yes, you'll gag, and it will be unpleasant--but not for long). Spend about ten seconds brushing that area.   Once you can touch your toothbrush on that spot without gagging, it's time to move the toothbrush further back. Try brushing slightly behind where your gag used to begin. This is your new starting point. Repeat the process as you did in the first spot.  Continue moving the brush farther and farther back. Each time you move the toothbrush back, your gag has been desensitized in the previous spot. Eventually, the toothbrush will come in contact with the soft palate and uvula.   Be persistent - practice regularly. A good way to maintain your progress desensitized is to brush your tongue regularly.     Additional Strategies [place]:  Make a fist (thumb in)    Salt on tongue    Sucking (not chewing) on ice chips    Humming (see below)    Breathing (see below)                   Pressure points: "   Nei-Kaylene acupoint (aka: Neiguan point): Place your middle three fingers on the inside of your wrist with the edge of the third finger on the wrist crease. The Nei-Kaylene point is just under the edge of your index finger between the two central tendons.  Hegu point: between thumb and index finger  [place] point: a slight groove found FDC between your chin and lower lip.    Other considerations:  [place]: works like pressure points    Topical anesthetic (Should not be used frequently, only for specific, short-term use.    Cough/Throat Clear/ Reducing irritation:   Sip of water   Gargle  With a voice  Osyka chirp -  jackiekaaa kakakaaa   Tilt your head side to side  Dry hard swallow  Hum on  m  + dry hard swallow  Breathe in through rounded lips + out with a  sh   Suck on a lozenge with Pectin (avoid mint or menthol) or a sugar-free candy  Puppy Sniffs - 2-3 small quick sniffs through the nose and exhale with  sh   Wait      Senait Wagner M.M. (voice) M.A., CCC/SLP  Speech-Language Pathologist  [facility] Certified Vocologist  [facility]  [email or web address]  she/her

## 2024-04-23 NOTE — LETTER
"4/23/2024       RE: Rafa Caldwell  [address] [place] [address]     Dear Colleague,    Thank you for referring your patient, Rafa Caldwell, to the [facility] VOICE CLINIC [facility]. Please see a copy of my visit note below.      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 Perceptually, he demonstrated mild to moderate roughness and breathiness, and intermittent subtle pitch instability.  Laryngoscopy today demonstrated off white-pink and moderate edema comprising the entire length of the vocal fold. No visible signs of hemorrhage. Therefore, continued care and therapy have been recommended.  He is in agreement with this plan of care.  Basic instructions were provided.   Dysphonia R49.0  Laryngeal Hyperfunction J38.7  Vocal Fold Edema\"    SUBJECTIVE:  Since his last session, Mr. Caldwell reports the following:   Overall he reports that symptoms are mildly variable  He has had 2 evening performances and continues to notice some roughness and breathiness to the voice    OBJECTIVE:  Mr. Caldwell presents today with the following:  Voice quality:  Mild to moderate intermittent roughness and breathiness  Pitch around 130 Hz  Loudness is mildly reduced.    PATIENT REPORTED MEASURES:  Patient Supplied Answers To SLP QOL Questionnaire       No data to display              Patient Supplied Answers To VHI Questionnaire      4/19/2024     1:37 PM   Voice Handicap Index (VHI-10)   My voice makes it difficult for people to hear me 0   People have difficulty understanding me in a noisy room 0   My voice difficulties restrict my personal and " "social life.  2   I feel left out of conversations because of my voice 2   My voice problem causes me to lose income 2   I feel as though I have to strain to produce voice 1   The clarity of my voice is unpredictable 2   My voice problem upsets me 1   My voice makes me feel handicapped 2   People ask, \"What's wrong with your voice?\" 1   VHI-10 13     LARYNGEAL EXAMINATION  Laryngeal exam with stroboscopy was warranted to observe the progress of his healing following a period of 5 days of therapeutic voice rest.  Procedure:   Flexible endoscopy with chip-tip technology with stroboscopy, right nostril; topical anesthesia with 2% lidocaine and 0.025% oxymetazoline was sprayed into the nasal cavity and allowed 3 to 5 minutes for effect.  Lidocaine was also administered orally to the base of tongue, tonsils, and pharynx.   Performed by: Senait Wagner M.M. (voice), M.A., CCC-SLP  The laryngeal and pharyngeal structures were evaluated for gross appearance, mobility, function, and focal lesions / abnormalities of the associated mucosa.  Stroboscopy was warranted to evaluate closure, symmetry, and vibratory characteristics of the vocal folds.  All findings were within normal limits with the exception of the following salient features:     *It should be noted that he has a significant gag reflex. The exam improved with the use of alternating phonation with breath flow and phonation.    This exam shows the following:    Posterior commissure: no remarkable inflammation.  Secretions: WNL    Movement:  Right Vocal Fold: Normal  Left Vocal Fold: Normal  Supraglottic hyperfunction during connected speech tasks: marked AP constriction present during connected speech and belting trials.    Glottic Closure: complete, often pressed  Upper Airway: Patent    Vocal Fold Findings:  Right Vocal Fold: essentially normal and white in color  Left Vocal Fold: mild to moderate edema and erythema, overall reduced from initial exam.         The " addition of stroboscopy allowed evaluation of the mucosal wave:  Amplitude: right: WNL; left: mildly increased due to edema  Symmetry: associated asymmetry and chasing asymmetry.  Closure pattern: complete.   Closure plane: at glottic level.   Phase distribution: normal.    The laryngeal exam was reviewed with Mr. Caldwell, and I provided pertinent explanations, as well as written and oral information.      THERAPEUTIC ACTIVITIES  Demonstrated previous exercises.  demonstrated improved technique  appropriate redirection provided  instruction provided for increased level of complexity/difficulty    [place] techniques for gag desensitization to improve his ability to complete laryngeal exams.  Demonstrated 90% understanding with mild to moderate instruction  I encouraged him to complete daily practice  Over the course of the 3 exams that were completed, he found that alternating phonation with breath was very facilitating.    Chronic cough / throat clearing/ laryngeal hypersensitivity reduction therapy  Suppression and substitution strategies were instructed including  Swallowing substitution techniques  Breathing suppression techniques to reduce laryngeal tension  Low impact glottic coup and soft cough    Counseling and Education:  Asked many questions about the nature of his symptoms, and I answered all of these thoroughly.  A revised regimen for home practice was instructed.  I provided an AVS of today's therapeutic activities to facilitate practice.    ASSESSMENT/PLAN  PROGRESS TOWARD LONG TERM GOALS:   Adequate but incomplete progress; please see above    IMPRESSIONS: Dysphonia R49.0 secondary to edema of the left vocal fold. Mr. Caldwell continues to demonstrate edema of the left vocal fold, however, the erythema has reduced after 5 days of therapeutic voice rest involving semi-occluded vocal tract exercises, as well as strategies to reduce valsalva pressure during daily activities (e.g.: reducing weight lifting  "and applying \"shhh\" on exertion, and refraining from trumpet playing).  He also learned additional techniques to reduce the severity of his gag reflex.    PLAN: I will see Mr. Caldwell on Thursday for another laryngeal exam after two evening performances of the [employer].  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  For practice goals see AVS.       TOTAL SERVICE TIME: 60 minutes  TREATMENT (74022)  ENDOSCOPIC LARYNGEAL EXAMINATION WITH STROBOSCOPY (99593)  NO CHARGE FACILITY FEE (20861)    Senait Wagner M.M. (voice) MMARILIA., CCC/SLP  Speech-Language Pathologist  Certificate of Vocology  [facility]  [phone]  [email or web address]  Pronouns: she/her    Again, thank you for allowing me to participate in the care of your patient.      Sincerely,    Senait Wagner, SLP    "

## 2024-04-25 ENCOUNTER — OFFICE VISIT (OUTPATIENT)
Dept: OTOLARYNGOLOGY | Facility: CLINIC | Age: 39
End: 2024-04-25
Payer: COMMERCIAL

## 2024-04-25 DIAGNOSIS — R49.0 DYSPHONIA: Primary | ICD-10-CM

## 2024-04-25 PROCEDURE — 31579 LARYNGOSCOPY TELESCOPIC: CPT | Performed by: SPEECH-LANGUAGE PATHOLOGIST

## 2024-04-25 NOTE — LETTER
"4/25/2024       RE: Rafa Caldwell  [address] [place] [address]     Dear Colleague,    Thank you for referring your patient, Rafa Caldwell, to the [facility] VOICE CLINIC [place] [facility]. Please see a copy of my visit note below.      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 Perceptually, he demonstrated mild to moderate roughness and breathiness, and intermittent subtle pitch instability.  Laryngoscopy today demonstrated off white-pink and moderate edema comprising the entire length of the vocal fold. No visible signs of hemorrhage. Therefore, continued care and therapy have been recommended.  He is in agreement with this plan of care.  Basic instructions were provided.   Dysphonia R49.0  Laryngeal Hyperfunction J38.7  Vocal Fold Edema\"    SUBJECTIVE:  Since his last session, Mr. Caldwell reports the following:   Overall he reports that his speaking and singing voice are slightly rougher today.  He has been diligent with the therapeutic recommendations.   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  We agreed that it seemed as though the voice warmed into his singing last night.  He continued to reduce his talking as much as possible, hydrate, steam, rest, and complete the therapeutic activities provided.  He is eager to see how things are going.    OBJECTIVE:  Mr. Caldwell presents today with the " "following:  Appropriate respiratory mechanics    Voice quality:  Mild to moderate intermittent roughness and breathiness  Intermittent laryngeal focus of resonance when not paying attention. Mild verbal cue by clinician, and he was able to quickly improve resonance, and ensure that he took a breath before speaking.    THROAT ISSUES  No observed coughing or throat clearing     PATIENT REPORTED MEASURES:  Patient Supplied Answers To SLP QOL Questionnaire       No data to display              Patient Supplied Answers To VHI Questionnaire      4/19/2024     1:37 PM   Voice Handicap Index (VHI-10)   My voice makes it difficult for people to hear me 0   People have difficulty understanding me in a noisy room 0   My voice difficulties restrict my personal and social life.  2   I feel left out of conversations because of my voice 2   My voice problem causes me to lose income 2   I feel as though I have to strain to produce voice 1   The clarity of my voice is unpredictable 2   My voice problem upsets me 1   My voice makes me feel handicapped 2   People ask, \"What's wrong with your voice?\" 1   VHI-10 13     LARYNGEAL EXAMINATION  Procedure:    Flexible endoscopy with chip-tip technology with stroboscopy, left nostril; topical anesthesia with 2% lidocaine and 0.025% oxymetazoline was sprayed into the nasal cavity and allowed 3 to 5 minutes for effect.  Performed by: Senait Wagner M.M. (voice), MCarleenACarleen, CCC-SLP  The laryngeal and pharyngeal structures were evaluated for gross appearance, mobility, function, and focal lesions / abnormalities of the associated mucosa.  Stroboscopy was warranted to evaluate closure, symmetry, and vibratory characteristics of the vocal folds.  All findings were within normal limits with the exception of the following salient features:     *It should be noted that he has a significant gag reflex. The exam improved with the use of alternating phonation with breath flow and phonation, and he has " improved since his first exam last week.     This exam shows the following:    Posterior commissure: No remarkable inflammation.  Secretions: WNL    Movement:  Arytenoid cartilages are not symmetrical during adduction (possible tension, but also asymmetrical anatomy)  Right Vocal Fold: Normal  Left Vocal Fold: Normal  Supraglottic hyperfunction during connected speech tasks: Supraglottic hyperfunction during connected speech tasks: marked AP constriction present during connected speech and belting trials.     Glottic Closure: complete, often pressed  Upper Airway: Patent    Vocal Fold Findings:  Right Vocal Fold: essentially normal and white in color  Left Vocal Fold: mild to moderate edema and erythemal; mild increase since last exam, but still a reduction in edema from initial exam.           The laryngeal exam was reviewed with Mr. Caldwell, and I provided pertinent              explanations, as well as written and oral information.     The addition of stroboscopy allowed evaluation of the mucosal wave:  Amplitude: right: WNL; left: mildly increased due to edema  Symmetry: associated, chasing asymmetry. Improved symmetry in upper range (possibly associated with lengthening of vocal folds and distribution of edema)  Closure pattern: complete.   Closure plane: at glottic level.   Phase distribution: normal      Best abduction      Adducted view with mild to moderate AP constriction. Left TVF demonstrates an increase in edema and erythema compared to the right TVF      Narrow band abducted view    The laryngeal exam was reviewed with Mr. Caldwell, and I provided pertinent explanations, as well as written and oral information.     Counseling :  Asked many questions about the nature of his symptoms, and I answered all of these thoroughly.  I provided a letter of medical necessity to support ongoing workability recommendations  A revised regimen for home practice was instructed.    ASSESSMENT/PLAN  PROGRESS TOWARD LONG  TERM GOALS:   Adequate but incomplete progress; please see above    IMPRESSIONS: Dysphonia R49.0 secondary to edema of the left vocal fold.  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  He was discouraged by this news, however I reminded him that it can take up to 3 weeks for the symptoms of an upper respiratory illness to fully resolve.  I recommended that he continue to alternate performances, avoiding 2 performances in 1 day, in order to allow more time for healing.  I provided a letter to management to support this and the other therapeutic recommendations I provided, which included reduced voice use, avoiding behaviors that would lead to trauma of the vocal folds, regular practice of therapeutic activities that promote healing of the vocal folds.  I will also help him to coordinate his care in [place] and in [place].    PLAN: Mr. Caldwell will continue to work independently time, however I have encouraged him to reach out to me as needed to help with any interim needs before he is able to proceed with care at the [facility] in [place].    TOTAL SERVICE TIME: 60 minutes  ENDOSCOPIC LARYNGEAL EXAMINATION WITH STROBOSCOPY (31248)  NO CHARGE FACILITY FEE (72228)    Senait Wagner M.M. (voice), M.A., CCC/SLP  Speech-Language Pathologist  Certificate of Vocology  [facility]  [phone]  [email or web address]  Pronouns: she/her      Again, thank you for allowing me to participate in the care of your patient.      Sincerely,    Senait Wagner, SLP

## 2024-04-26 NOTE — PATIENT INSTRUCTIONS
[facility]  Patrice Rodriguez Jr., M.D., F.A.C.S.  Nhi Lawrence M.D., M.P.H.  Bertha Adair M.D.  Senait Wagner M.M. (voice), M.A., CCC-SLP  Amanda Garcia M.S., CCC-SLP  Mervat Win, Ph.D., CCC-SLP  Ralph Pichardo, Ph.D., CCC-SLP  Johnna Magdaleno M.S., CCC-SLP  Herve Frausto M.M., M.A., CCC-SLP  TARA Sherman (voice), M.S., CCC-SLP    Date of Visit: 4/25/2024    To: Whom it may concern     RE: Mr. Rafa Caldwell     Dear Whom it may concern,     This letter is in support of accommodation required by my patient,  Rafa Caldwell. He is under the care of Ms. Senait Wagner at [facility] in the Department of Otolaryngology.  He was last seen by me on 4/25/24.  The laryngeal exam from 4/18/24 demonstrated moderate unilateral vocal fold edema and erythema and secondary dysphonia.  He then demonstrated good improvement with a reduction in edema and erythema on 4/23/24 after good follow through of therapeutic recommendations and five days of vocal rest from performances.  However, he demonstrates an mild in erythema when compared to his 4/23/24 laryngeal exam.  This demonstrates the need for ongoing periodic vocal rest and a reduced performance schedule to continue to promote healing.  Like an upper respiratory virus, healing can take time for full recovery.  The current management and treatment plan includes medical intervention and speech therapy.  I will also be consulting more with one of our laryngologists, Dr. Nhi Lawrence, regarding his voice issues/ concerns.     To appropriately treat this condition it is medically necessary that Mr. Caldwell:  Dr. Lawrence may recommend medical intervention, which I will keep him informed about.  Receive rehabilitative speech therapy services (CPT Code 10887).  This course of treatment will focus on strategies to:  improve vocal efficiency   decrease laryngeal irritability  address any risk of needing future surgical intervention.    Specific  recommendations that are medically necessary for Mr. Caldwell to complete daily include:  Reduce daily talking to 10% of a typical day.  Limit Mr. Caldwell voice use at work and at home, including loud talking, shouting, projection, etc., other that the singing for the [employer] performance.  No phone use for more than a 2 minutes (caller speaking more than Mr. Caldwell)  Avoid lifting weight greater than 25 lbs.    Semi-occluded vocal tract exercises are needed every hour(s) in order to complete the therapeutic voice activities learned in speech therapy.  Wear a mask when exposed to harsh, irritating chemicals (eg: cleaning products, markers, stage smoke)     I have encouraged Mr. Caldwell to continue with the recommendations listed above, and I will assist in connecting him to voice clinics in [place] and [place], to continue to monitor his healing, and provide any needed voice therapy.  It is also recommended that he not perform on the following dates: 4/25/24: Evening performance, 4/26/24 - 4/28/24 for the remainder of the [place] run, and the [employer] performances on 5/2, 5/4, and 5/5 in [place], [place].      These recommendations may change based on the ongoing monitoring at the [facility] in [place], [place].  Thank you for your consideration in this matter.  If you have any questions, please do not hesitate to contact us at [phone].     Sincerely,      Senait Wagner M.M. (voice), M.A., CCC-SLP  Speech-Language Pathologist & Voice SLP Supervisor  [facility] - Doctor of Speech-Language Pathology Candidate '24  [employer] Trained Vocologist  [facility]  she/her  [email or web address]

## 2024-04-30 ENCOUNTER — DOCUMENTATION ONLY (OUTPATIENT)
Dept: OTOLARYNGOLOGY | Facility: CLINIC | Age: 39
End: 2024-04-30
Payer: COMMERCIAL

## 2024-04-30 ENCOUNTER — TELEPHONE (OUTPATIENT)
Dept: OTOLARYNGOLOGY | Facility: CLINIC | Age: 39
End: 2024-04-30
Payer: COMMERCIAL

## 2024-04-30 DIAGNOSIS — R49.0 DYSPHONIA: Primary | ICD-10-CM

## 2024-04-30 NOTE — TELEPHONE ENCOUNTER
[facility] Call Center    Phone Message    May a detailed message be left on voicemail: yes     Reason for Call: Other: Gabby would like to speak with Theresa Morales that sent a referral for this pt to [facility]. She needs to know if there is a recent video strobe, and needs that report, Also she needs to know if there is insurance. Please call Gabby at [phone]. Thanks.     Action Taken: Message routed to:  [facility] ENT    Travel Screening: Not Applicable

## 2024-04-30 NOTE — TELEPHONE ENCOUNTER
Returned call to [facility] and got vonadeemmail. Message left and writers contact information was provided on  for return call.

## 2024-04-30 NOTE — PROGRESS NOTES
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 Perceptually, he demonstrated mild to moderate roughness and breathiness, and intermittent subtle pitch instability.  Laryngoscopy today demonstrated off white-pink and moderate edema comprising the entire length of the vocal fold. No visible signs of hemorrhage. Therefore, continued care and therapy have been recommended.  He is in agreement with this plan of care.  Basic instructions were provided.   Dysphonia R49.0  Laryngeal Hyperfunction J38.7  Vocal Fold Edema\"    SUBJECTIVE:  Since his last session, Mr. Caldwell reports the following:   Overall he reports that his speaking and singing voice are slightly rougher today.  He has been diligent with the therapeutic recommendations.   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  We agreed that it seemed as though the voice warmed into his singing last night.  He continued to reduce his talking as much as possible, hydrate, steam, rest, and complete the therapeutic activities provided.  He is eager to see how things are going.    OBJECTIVE:  Mr. Caldwell presents today with the following:  Appropriate respiratory mechanics    Voice quality:  Mild to moderate intermittent roughness and breathiness  Intermittent laryngeal focus of resonance when not paying attention. Mild verbal cue by clinician, and he was able to quickly improve resonance, and ensure that he took a breath before " "speaking.    THROAT ISSUES  No observed coughing or throat clearing     PATIENT REPORTED MEASURES:  Patient Supplied Answers To SLP QOL Questionnaire       No data to display              Patient Supplied Answers To VHI Questionnaire      4/19/2024     1:37 PM   Voice Handicap Index (VHI-10)   My voice makes it difficult for people to hear me 0   People have difficulty understanding me in a noisy room 0   My voice difficulties restrict my personal and social life.  2   I feel left out of conversations because of my voice 2   My voice problem causes me to lose income 2   I feel as though I have to strain to produce voice 1   The clarity of my voice is unpredictable 2   My voice problem upsets me 1   My voice makes me feel handicapped 2   People ask, \"What's wrong with your voice?\" 1   VHI-10 13     LARYNGEAL EXAMINATION  Procedure:    Flexible endoscopy with chip-tip technology with stroboscopy, left nostril; topical anesthesia with 2% lidocaine and 0.025% oxymetazoline was sprayed into the nasal cavity and allowed 3 to 5 minutes for effect.  Performed by: Senait Wagner M.M. (voice), M.A., CCC-SLP  The laryngeal and pharyngeal structures were evaluated for gross appearance, mobility, function, and focal lesions / abnormalities of the associated mucosa.  Stroboscopy was warranted to evaluate closure, symmetry, and vibratory characteristics of the vocal folds.  All findings were within normal limits with the exception of the following salient features:     *It should be noted that he has a significant gag reflex. The exam improved with the use of alternating phonation with breath flow and phonation, and he has improved since his first exam last week.     This exam shows the following:    Posterior commissure: No remarkable inflammation.  Secretions: WNL    Movement:  Arytenoid cartilages are not symmetrical during adduction (possible tension, but also asymmetrical anatomy)  Right Vocal Fold: Normal  Left Vocal Fold: " Normal  Supraglottic hyperfunction during connected speech tasks: Supraglottic hyperfunction during connected speech tasks: marked AP constriction present during connected speech and belting trials.     Glottic Closure: complete, often pressed  Upper Airway: Patent    Vocal Fold Findings:  Right Vocal Fold: essentially normal and white in color  Left Vocal Fold: mild to moderate edema and erythemal; mild increase since last exam, but still a reduction in edema from initial exam.           The laryngeal exam was reviewed with  Javi, and I provided pertinent              explanations, as well as written and oral information.     The addition of stroboscopy allowed evaluation of the mucosal wave:  Amplitude: right: WNL; left: mildly increased due to edema  Symmetry: associated, chasing asymmetry. Improved symmetry in upper range (possibly associated with lengthening of vocal folds and distribution of edema)  Closure pattern: complete.   Closure plane: at glottic level.   Phase distribution: normal      Best abduction      Adducted view with mild to moderate AP constriction. Left TVF demonstrates an increase in edema and erythema compared to the right TVF      Narrow band abducted view    The laryngeal exam was reviewed with  Caldwell, and I provided pertinent explanations, as well as written and oral information.     Counseling :  Asked many questions about the nature of his symptoms, and I answered all of these thoroughly.  I provided a letter of medical necessity to support ongoing workability recommendations  A revised regimen for home practice was instructed.    ASSESSMENT/PLAN  PROGRESS TOWARD LONG TERM GOALS:   Adequate but incomplete progress; please see above    IMPRESSIONS: Dysphonia R49.0 secondary to edema of the left vocal fold.  Today there was a slight increase in erythema and edema of the left vocal fold after 2 evening performances of the [employer].  He was discouraged by this news, however I  reminded him that it can take up to 3 weeks for the symptoms of an upper respiratory illness to fully resolve.  I recommended that he continue to alternate performances, avoiding 2 performances in 1 day, in order to allow more time for healing.  I provided a letter to management to support this and the other therapeutic recommendations I provided, which included reduced voice use, avoiding behaviors that would lead to trauma of the vocal folds, regular practice of therapeutic activities that promote healing of the vocal folds.  I will also help him to coordinate his care in [place] and in [place].    PLAN: Mr. Caldwell will continue to work independently time, however I have encouraged him to reach out to me as needed to help with any interim needs before he is able to proceed with care at the [facility] in [place].    TOTAL SERVICE TIME: 60 minutes  ENDOSCOPIC LARYNGEAL EXAMINATION WITH STROBOSCOPY (77213)  NO CHARGE FACILITY FEE (53758)    Senait Wagner M.M. (voice), M.A., CCC/SLP  Speech-Language Pathologist  Certificate of Vocology  [facility]  [phone]  [email or web address]  [email or web address]: she/her      [email or web address]

## 2024-04-30 NOTE — PROGRESS NOTES
Speech therapy referral faxed to [facility] [place] [facility]. Fax [phone] and Rehab and to [facility]. Fax [phone]. 4 pages faxed to each.

## 2024-04-30 NOTE — PROGRESS NOTES
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 Perceptually, he demonstrated mild to moderate roughness and breathiness, and intermittent subtle pitch instability.  Laryngoscopy today demonstrated off white-pink and moderate edema comprising the entire length of the vocal fold. No visible signs of hemorrhage. Therefore, continued care and therapy have been recommended.  He is in agreement with this plan of care.  Basic instructions were provided.   Dysphonia R49.0  Laryngeal Hyperfunction J38.7  Vocal Fold Edema\"    SUBJECTIVE:  Since his last session, Mr. Caldwell reports the following:   Overall he reports that symptoms are mildly variable  He has had 2 evening performances and continues to notice some roughness and breathiness to the voice    OBJECTIVE:  Mr. Caldwell presents today with the following:  Voice quality:  Mild to moderate intermittent roughness and breathiness  Pitch around 130 Hz  Loudness is mildly reduced.    PATIENT REPORTED MEASURES:  Patient Supplied Answers To SLP QOL Questionnaire       No data to display              Patient Supplied Answers To VHI Questionnaire      4/19/2024     1:37 PM   Voice Handicap Index (VHI-10)   My voice makes it difficult for people to hear me 0   People have difficulty understanding me in a noisy room 0   My voice difficulties restrict my personal and social life.  2   I feel left out of conversations because of my voice 2   My voice problem causes me to lose income 2   I feel as though I have to strain to produce voice 1   The clarity of my voice is unpredictable 2   My voice problem upsets me 1   My voice makes me feel handicapped 2   People ask, \"What's " "wrong with your voice?\" 1   VHI-10 13     LARYNGEAL EXAMINATION  Laryngeal exam with stroboscopy was warranted to observe the progress of his healing following a period of 5 days of therapeutic voice rest.  Procedure:   Flexible endoscopy with chip-tip technology with stroboscopy, right nostril; topical anesthesia with 2% lidocaine and 0.025% oxymetazoline was sprayed into the nasal cavity and allowed 3 to 5 minutes for effect.  Lidocaine was also administered orally to the base of tongue, tonsils, and pharynx.   Performed by: Senait Wagner M.M. (voice), M.A., CCC-SLP  The laryngeal and pharyngeal structures were evaluated for gross appearance, mobility, function, and focal lesions / abnormalities of the associated mucosa.  Stroboscopy was warranted to evaluate closure, symmetry, and vibratory characteristics of the vocal folds.  All findings were within normal limits with the exception of the following salient features:     *It should be noted that he has a significant gag reflex. The exam improved with the use of alternating phonation with breath flow and phonation.    This exam shows the following:    Posterior commissure: no remarkable inflammation.  Secretions: WNL    Movement:  Right Vocal Fold: Normal  Left Vocal Fold: Normal  Supraglottic hyperfunction during connected speech tasks: marked AP constriction present during connected speech and belting trials.    Glottic Closure: complete, often pressed  Upper Airway: Patent    Vocal Fold Findings:  Right Vocal Fold: essentially normal and white in color  Left Vocal Fold: mild to moderate edema and erythema, overall reduced from initial exam.         The addition of stroboscopy allowed evaluation of the mucosal wave:  Amplitude: right: WNL; left: mildly increased due to edema  Symmetry: associated asymmetry and chasing asymmetry.  Closure pattern: complete.   Closure plane: at glottic level.   Phase distribution: normal.      Adducted view with reduced edema and " "erythema of the left TVF      Partial abduction demonstrates reduction of edema an erythema. Vocal folds are now similar in appearance.      Due to the sensitivity of his gag reflex it was very difficult to achieve a fully abducted view.    The laryngeal exam was reviewed with Mr. Caldwell, and I provided pertinent explanations, as well as written and oral information.      THERAPEUTIC ACTIVITIES  Demonstrated previous exercises.  demonstrated improved technique  appropriate redirection provided  instruction provided for increased level of complexity/difficulty    [place] techniques for gag desensitization to improve his ability to complete laryngeal exams.  Demonstrated 90% understanding with mild to moderate instruction  I encouraged him to complete daily practice  Over the course of the 3 exams that were completed, he found that alternating phonation with breath was very facilitating.    Chronic cough / throat clearing/ laryngeal hypersensitivity reduction therapy  Suppression and substitution strategies were instructed including  Swallowing substitution techniques  Breathing suppression techniques to reduce laryngeal tension  Low impact glottic coup and soft cough    Counseling and Education:  Asked many questions about the nature of his symptoms, and I answered all of these thoroughly.  A revised regimen for home practice was instructed.  I provided an AVS of today's therapeutic activities to facilitate practice.    ASSESSMENT/PLAN  PROGRESS TOWARD LONG TERM GOALS:   Adequate but incomplete progress; please see above    IMPRESSIONS: Dysphonia R49.0 secondary to edema of the left vocal fold. Mr. Caldwell continues to demonstrate edema of the left vocal fold, however, the erythema has reduced after 5 days of therapeutic voice rest involving semi-occluded vocal tract exercises, as well as strategies to reduce valsalva pressure during daily activities (e.g.: reducing weight lifting and applying \"shhh\" on exertion, " and refraining from trumpet playing).  He also learned additional techniques to reduce the severity of his gag reflex.    PLAN: I will see Mr. Caldwell on Thursday for another laryngeal exam after two evening performances of the [employer].  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  For practice goals see AVS.       TOTAL SERVICE TIME: 60 minutes  TREATMENT (25268)  ENDOSCOPIC LARYNGEAL EXAMINATION WITH STROBOSCOPY (90907)  NO CHARGE FACILITY FEE (70486)    Senait Wagner M.M. (voice), M.A., CCC/SLP  Speech-Language Pathologist  Certificate of Vocology  [facility]  [phone]  [email or web address]  Pronouns: she/her

## 2024-04-30 NOTE — PROGRESS NOTES
Senait Dunham office notes from 4/18/24 to 4/25/24 were faxed to Gabby at [facility]. Fax number [phone]. 16 pgs faxed.

## 2024-05-07 DIAGNOSIS — R49.0 DYSPHONIA: Primary | ICD-10-CM

## 2024-05-19 ENCOUNTER — HEALTH MAINTENANCE LETTER (OUTPATIENT)
Age: 39
End: 2024-05-19

## 2024-09-18 NOTE — TELEPHONE ENCOUNTER
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     RECORDS REQUESTED FROM      Clinic name Comments Records Status Imaging Status   [facility] DYSPHONIA  4/25/24 OV -Senait Wagner, SLP  [facility]  [place], [place] 5/22/24 OV -Ashley Mancini MD    5/22/24 OV -Melania Marie CCC-SLP   CE            \"Please notify/message CSS if patient completed outside imaging prior to scheduled appointment and/or any outside records that might have been missed at pre visit -Thanks\"  "

## 2024-11-21 ENCOUNTER — PRE VISIT (OUTPATIENT)
Dept: OTOLARYNGOLOGY | Facility: CLINIC | Age: 39
End: 2024-11-21

## 2025-06-08 ENCOUNTER — HEALTH MAINTENANCE LETTER (OUTPATIENT)
Age: 40
End: 2025-06-08